# Patient Record
Sex: MALE | Race: ASIAN | NOT HISPANIC OR LATINO | URBAN - METROPOLITAN AREA
[De-identification: names, ages, dates, MRNs, and addresses within clinical notes are randomized per-mention and may not be internally consistent; named-entity substitution may affect disease eponyms.]

---

## 2017-02-04 ENCOUNTER — EMERGENCY (EMERGENCY)
Facility: HOSPITAL | Age: 32
LOS: 1 days | Discharge: HOME | End: 2017-02-04
Attending: EMERGENCY MEDICINE
Payer: COMMERCIAL

## 2017-02-04 VITALS
WEIGHT: 265 LBS | SYSTOLIC BLOOD PRESSURE: 135 MMHG | RESPIRATION RATE: 18 BRPM | OXYGEN SATURATION: 96 % | DIASTOLIC BLOOD PRESSURE: 95 MMHG | TEMPERATURE: 98 F | HEART RATE: 92 BPM | HEIGHT: 72 IN

## 2017-02-04 VITALS
RESPIRATION RATE: 17 BRPM | DIASTOLIC BLOOD PRESSURE: 85 MMHG | HEART RATE: 88 BPM | SYSTOLIC BLOOD PRESSURE: 122 MMHG | OXYGEN SATURATION: 97 %

## 2017-02-04 DIAGNOSIS — R05 COUGH: ICD-10-CM

## 2017-02-04 DIAGNOSIS — J18.9 PNEUMONIA, UNSPECIFIED ORGANISM: ICD-10-CM

## 2017-02-04 DIAGNOSIS — J45.909 UNSPECIFIED ASTHMA, UNCOMPLICATED: ICD-10-CM

## 2017-02-04 DIAGNOSIS — F17.210 NICOTINE DEPENDENCE, CIGARETTES, UNCOMPLICATED: ICD-10-CM

## 2017-02-04 PROCEDURE — 99284 EMERGENCY DEPT VISIT MOD MDM: CPT | Mod: 25

## 2017-02-04 PROCEDURE — 71020: CPT | Mod: 26

## 2017-02-04 PROCEDURE — 93005 ELECTROCARDIOGRAM TRACING: CPT

## 2017-02-04 PROCEDURE — 99283 EMERGENCY DEPT VISIT LOW MDM: CPT | Mod: 25

## 2017-02-04 PROCEDURE — 93010 ELECTROCARDIOGRAM REPORT: CPT

## 2017-02-04 PROCEDURE — 71046 X-RAY EXAM CHEST 2 VIEWS: CPT

## 2017-02-04 RX ORDER — IBUPROFEN 200 MG
600 TABLET ORAL ONCE
Qty: 0 | Refills: 0 | Status: COMPLETED | OUTPATIENT
Start: 2017-02-04 | End: 2017-02-04

## 2017-02-04 RX ORDER — ALBUTEROL 90 UG/1
2 AEROSOL, METERED ORAL
Qty: 1 | Refills: 0 | OUTPATIENT
Start: 2017-02-04 | End: 2017-03-06

## 2017-02-04 RX ADMIN — Medication 600 MILLIGRAM(S): at 02:47

## 2017-02-04 RX ADMIN — Medication 600 MILLIGRAM(S): at 01:33

## 2017-02-04 RX ADMIN — Medication 40 MILLIGRAM(S): at 01:33

## 2017-02-04 NOTE — ED PROVIDER NOTE - CARE PLAN
Principal Discharge DX:	CAP (community acquired pneumonia)  Instructions for follow-up, activity and diet:	You were diagnosed with pneumonia during your Emergency Department visit today.  Take levaquin (antibiotic, for infection) and prednisone (steroid, for inflammation/asthma) as instructed.  It is recommended that you quit smoking, as this can worsen lung infection/diseases, and puts you at risk for many other medical problems, including strokes, heart attacks, lung cancer, etc.  See your doctor within the next 1 week.  Return immediately if you have worsening pain, difficulty breathing, or other new/concerning symptoms.

## 2017-02-04 NOTE — ED PROVIDER NOTE - MEDICAL DECISION MAKING DETAILS
31yo M, remote history of "childhood asthma" (no home meds at present) and current smoker, p/w cough and chest pain with coughing and deep inspiration for the past 2 days.  On exam, is well appearing, afebrile, with no respiratory distress.  ? slight wheezing at right lung base.  PERC negative, very low concern for pe, no additional testing indicated.  Given smoking, h/o asthma, will trial duoneb and consider treatment with prednisone for 3-5 days.  Will obtain CXR given lung findings on exam, r/o pna.  If no consolidation and lack of fever or other symptoms, and short duration, likely viral respiratory infection and/or reactive airway disease, complicated by cigarette use; will dc with outpatient f/u, smoking cessation counseling, duonebs and prednisone. 31yo M, remote history of "childhood asthma" (no home meds at present) and current smoker, p/w cough and chest pain with coughing and deep inspiration for the past 2 days.  On exam, is well appearing, afebrile, with no respiratory distress.  ? slight wheezing at right lung base.  PERC negative, very low concern for pe, no additional testing indicated.  Given smoking, h/o asthma, will trial duoneb and consider treatment with prednisone for 3-5 days.  Will obtain CXR given lung findings on exam, r/o pna.  If no consolidation and lack of fever or other symptoms, and short duration, likely viral respiratory infection and/or reactive airway disease, complicated by cigarette use; will dc with outpatient f/u, smoking cessation counseling, duonebs and prednisone.    -Addendum: evidence of RUL consolidation on cxr, suggestive in this clinical context of PNA.  Given h/o asthma, smoking, will initiate levaquin abx, nebs/steroids; Rx for outpatient therapy (otherwise well appearing, low port score, stable for dc with outpatient f/u).

## 2017-02-04 NOTE — ED ADULT NURSE NOTE - OBJECTIVE STATEMENT
Patient came in ambulatory steady gait, A&Ox3 with a chief complaint of SOB with chest pain on exertion. Patient states difficulty breathing with big breaths so he is trying to take smaller breaths. Mucous with a beige color when coughing. Patient states this has been going on for 2 days. Patient denies recent travel with long trips. Patient sates positive smoker. history of asthma as a child. no allergies. no surgeries. Pain around the chest. patient states got over having a cold last week. no fevers.

## 2017-02-04 NOTE — ED PROVIDER NOTE - PROGRESS NOTE DETAILS
CXR shows evidence of RUL consolidation on cxr, suggestive in this clinical context of PNA.  Given h/o asthma, smoking, will initiate levaquin abx (risks/benefirst of ABx d/w patient, including possibility of tendon injuries, rec'd no exertion/exercise while on abx), nebs/steroids; Rx for outpatient therapy (otherwise well appearing, low port score, stable for dc with outpatient f/u).    Dalia

## 2017-02-04 NOTE — ED PROVIDER NOTE - OBJECTIVE STATEMENT
33 yo M, c/o coughing (productive of mucus - yellow) and shortness of breath since yesterday.  Patient states he has pain in the front of his chest that radiates around to the back (when coughing).  Hurts when he takes a deep breath.  No recent travel.  +Smoker.       PMH/PSH: none / history of childhood asthma; no h/o DVT/VTE  meds: none  Allergies: none    improved with cold / worse inside with heat

## 2020-10-25 NOTE — ED PROVIDER NOTE - CHIEF COMPLAINT
2:56 PM  UPDATE: Dtr signed consent w/ [facility]-unable to care for pt at home. Pt does not meet criteria for GIP. PAN list provided to family to review for new placement. SW to follow.        Ongoing SW/CM Assessment/Plan of Care Note     See SW/CM flowsheets for goals and other objective data.    Patient/Family discharge goal (s):   Hospice              Progress note:     Hospice order rec'd, family agreeable and chose [facility]-referral made.        The patient is a 32y Male complaining of cough.

## 2022-12-08 NOTE — ED PROVIDER NOTE - PLAN OF CARE
[facility] here to discuss with pt his high utilization of EMS and ER care. Per lazara, pt states he is homeless and comes here frequently due to insecure housing. Pt states he wants mental health care per Lazara with [facility].   You were diagnosed with pneumonia during your Emergency Department visit today.  Take levaquin (antibiotic, for infection) and prednisone (steroid, for inflammation/asthma) as instructed.  It is recommended that you quit smoking, as this can worsen lung infection/diseases, and puts you at risk for many other medical problems, including strokes, heart attacks, lung cancer, etc.  See your doctor within the next 1 week.  Return immediately if you have worsening pain, difficulty breathing, or other new/concerning symptoms.